# Patient Record
Sex: FEMALE | Race: BLACK OR AFRICAN AMERICAN | NOT HISPANIC OR LATINO
[De-identification: names, ages, dates, MRNs, and addresses within clinical notes are randomized per-mention and may not be internally consistent; named-entity substitution may affect disease eponyms.]

---

## 2017-10-26 ENCOUNTER — APPOINTMENT (OUTPATIENT)
Dept: NEUROLOGY | Facility: CLINIC | Age: 57
End: 2017-10-26

## 2017-10-26 VITALS
WEIGHT: 130 LBS | BODY MASS INDEX: 23.92 KG/M2 | OXYGEN SATURATION: 97 % | SYSTOLIC BLOOD PRESSURE: 110 MMHG | DIASTOLIC BLOOD PRESSURE: 70 MMHG | TEMPERATURE: 98 F | HEIGHT: 62 IN | HEART RATE: 87 BPM

## 2017-11-02 ENCOUNTER — APPOINTMENT (OUTPATIENT)
Dept: PULMONOLOGY | Facility: CLINIC | Age: 57
End: 2017-11-02

## 2017-11-07 ENCOUNTER — APPOINTMENT (OUTPATIENT)
Dept: MRI IMAGING | Facility: CLINIC | Age: 57
End: 2017-11-07
Payer: MEDICARE

## 2017-11-07 ENCOUNTER — OUTPATIENT (OUTPATIENT)
Dept: OUTPATIENT SERVICES | Facility: HOSPITAL | Age: 57
LOS: 1 days | End: 2017-11-07
Payer: MEDICAID

## 2017-11-07 PROCEDURE — 70553 MRI BRAIN STEM W/O & W/DYE: CPT

## 2017-11-07 PROCEDURE — 70553 MRI BRAIN STEM W/O & W/DYE: CPT | Mod: 26

## 2017-11-07 PROCEDURE — A9585: CPT

## 2017-11-08 ENCOUNTER — RX RENEWAL (OUTPATIENT)
Age: 57
End: 2017-11-08

## 2017-11-08 ENCOUNTER — OTHER (OUTPATIENT)
Age: 57
End: 2017-11-08

## 2017-11-09 ENCOUNTER — APPOINTMENT (OUTPATIENT)
Dept: PULMONOLOGY | Facility: CLINIC | Age: 57
End: 2017-11-09

## 2017-11-19 ENCOUNTER — OUTPATIENT (OUTPATIENT)
Dept: OUTPATIENT SERVICES | Facility: HOSPITAL | Age: 57
LOS: 1 days | End: 2017-11-19
Payer: MEDICARE

## 2017-11-19 PROCEDURE — 70544 MR ANGIOGRAPHY HEAD W/O DYE: CPT | Mod: 26

## 2017-11-19 PROCEDURE — 70544 MR ANGIOGRAPHY HEAD W/O DYE: CPT

## 2017-12-06 ENCOUNTER — APPOINTMENT (OUTPATIENT)
Dept: NEUROLOGY | Facility: CLINIC | Age: 57
End: 2017-12-06

## 2017-12-13 ENCOUNTER — APPOINTMENT (OUTPATIENT)
Dept: NEUROLOGY | Facility: CLINIC | Age: 57
End: 2017-12-13
Payer: MEDICARE

## 2017-12-13 PROCEDURE — 95819 EEG AWAKE AND ASLEEP: CPT

## 2017-12-21 ENCOUNTER — MEDICATION RENEWAL (OUTPATIENT)
Age: 57
End: 2017-12-21

## 2018-02-27 ENCOUNTER — APPOINTMENT (OUTPATIENT)
Dept: NEUROLOGY | Facility: CLINIC | Age: 58
End: 2018-02-27

## 2018-07-27 ENCOUNTER — RX RENEWAL (OUTPATIENT)
Age: 58
End: 2018-07-27

## 2018-09-18 ENCOUNTER — APPOINTMENT (OUTPATIENT)
Dept: NEUROLOGY | Facility: CLINIC | Age: 58
End: 2018-09-18
Payer: MEDICARE

## 2018-09-18 VITALS
TEMPERATURE: 98.3 F | OXYGEN SATURATION: 100 % | DIASTOLIC BLOOD PRESSURE: 61 MMHG | WEIGHT: 120 LBS | BODY MASS INDEX: 22.08 KG/M2 | HEIGHT: 62 IN | SYSTOLIC BLOOD PRESSURE: 97 MMHG | HEART RATE: 78 BPM

## 2018-09-18 PROCEDURE — 99214 OFFICE O/P EST MOD 30 MIN: CPT

## 2018-09-18 PROCEDURE — 95819 EEG AWAKE AND ASLEEP: CPT

## 2018-09-19 ENCOUNTER — OTHER (OUTPATIENT)
Age: 58
End: 2018-09-19

## 2018-11-16 ENCOUNTER — MEDICATION RENEWAL (OUTPATIENT)
Age: 58
End: 2018-11-16

## 2018-11-26 ENCOUNTER — APPOINTMENT (OUTPATIENT)
Dept: CT IMAGING | Facility: HOSPITAL | Age: 58
End: 2018-11-26

## 2018-12-12 ENCOUNTER — APPOINTMENT (OUTPATIENT)
Dept: CT IMAGING | Facility: HOSPITAL | Age: 58
End: 2018-12-12
Payer: COMMERCIAL

## 2018-12-12 ENCOUNTER — OUTPATIENT (OUTPATIENT)
Dept: OUTPATIENT SERVICES | Facility: HOSPITAL | Age: 58
LOS: 1 days | End: 2018-12-12
Payer: COMMERCIAL

## 2018-12-12 PROCEDURE — 70496 CT ANGIOGRAPHY HEAD: CPT | Mod: 26

## 2018-12-12 PROCEDURE — 70496 CT ANGIOGRAPHY HEAD: CPT

## 2018-12-20 ENCOUNTER — OTHER (OUTPATIENT)
Age: 58
End: 2018-12-20

## 2019-05-08 ENCOUNTER — APPOINTMENT (OUTPATIENT)
Dept: OTOLARYNGOLOGY | Facility: CLINIC | Age: 59
End: 2019-05-08
Payer: MEDICARE

## 2019-05-08 VITALS
SYSTOLIC BLOOD PRESSURE: 130 MMHG | HEIGHT: 62 IN | HEART RATE: 78 BPM | DIASTOLIC BLOOD PRESSURE: 71 MMHG | WEIGHT: 113 LBS | BODY MASS INDEX: 20.8 KG/M2

## 2019-05-08 DIAGNOSIS — Z78.9 OTHER SPECIFIED HEALTH STATUS: ICD-10-CM

## 2019-05-08 DIAGNOSIS — K21.0 GASTRO-ESOPHAGEAL REFLUX DISEASE WITH ESOPHAGITIS: ICD-10-CM

## 2019-05-08 DIAGNOSIS — Z86.79 PERSONAL HISTORY OF OTHER DISEASES OF THE CIRCULATORY SYSTEM: ICD-10-CM

## 2019-05-08 DIAGNOSIS — F12.90 CANNABIS USE, UNSPECIFIED, UNCOMPLICATED: ICD-10-CM

## 2019-05-08 DIAGNOSIS — R05 COUGH: ICD-10-CM

## 2019-05-08 DIAGNOSIS — Z86.2 PERSONAL HISTORY OF DISEASES OF THE BLOOD AND BLOOD-FORMING ORGANS AND CERTAIN DISORDERS INVOLVING THE IMMUNE MECHANISM: ICD-10-CM

## 2019-05-08 DIAGNOSIS — R68.89 OTHER GENERAL SYMPTOMS AND SIGNS: ICD-10-CM

## 2019-05-08 PROCEDURE — 31575 DIAGNOSTIC LARYNGOSCOPY: CPT

## 2019-05-08 PROCEDURE — 99204 OFFICE O/P NEW MOD 45 MIN: CPT | Mod: 25

## 2019-05-14 ENCOUNTER — APPOINTMENT (OUTPATIENT)
Dept: NEUROLOGY | Facility: CLINIC | Age: 59
End: 2019-05-14
Payer: MEDICARE

## 2019-05-14 VITALS
SYSTOLIC BLOOD PRESSURE: 119 MMHG | TEMPERATURE: 99 F | DIASTOLIC BLOOD PRESSURE: 72 MMHG | HEIGHT: 62 IN | HEART RATE: 85 BPM | BODY MASS INDEX: 20.06 KG/M2 | WEIGHT: 109 LBS | OXYGEN SATURATION: 98 %

## 2019-05-14 PROCEDURE — 99214 OFFICE O/P EST MOD 30 MIN: CPT

## 2019-05-14 NOTE — PHYSICAL EXAM
[FreeTextEntry1] : neuro \par alert and oriented x3\par attn c ocn lang nl\par Cn intact in detail\par VA 20/25 bilat\par VFF\par Motor 5/5\par CSG wnl

## 2019-05-14 NOTE — ASSESSMENT
[FreeTextEntry1] : \par A/P- \par 1. Sarcoidosis, sickle cell trait and idiopathic strokes (none since 2005) on ASA. MRI stable in 2018.\par \par 2. Seizures- none since 2012- cont lev 500 bid\par \par 3. Incidental left ACom 2mm aneurysm- MRA one to two years\par \par 4. Thyroidectomy- cleared neurologically, will give keppra 1000mg day before and day of surgery\par \par rtc 6m\par . \par \par

## 2019-05-14 NOTE — HISTORY OF PRESENT ILLNESS
[FreeTextEntry1] : cc- seizures, strokes and sarcoid\par \par No auras of seizures since last visit.\par To have thyroidectomy.\par Also c/o HA behind her right eye that radiates to the right posterior head regions. \par \par Has been seizure free for over 6 yrs. \par \par Idiopathic strokes with sickle cell trait on baby ASA- likely before 2005.\par Saw pulmonologist- waiting on her PFT result.\par \par Had a Left ACOM- has  2mm aneurysm on MRA.\par EEG in Dec - LT epileptiform spikes\par \par meds- Lev 500 bid\par baby ASA one /d \par vit D\par meloxicam prn\par \par \par

## 2019-06-11 ENCOUNTER — EMERGENCY (EMERGENCY)
Facility: HOSPITAL | Age: 59
LOS: 1 days | Discharge: HOME | End: 2019-06-11
Admitting: EMERGENCY MEDICINE
Payer: COMMERCIAL

## 2019-06-11 VITALS
OXYGEN SATURATION: 97 % | TEMPERATURE: 98 F | SYSTOLIC BLOOD PRESSURE: 130 MMHG | HEART RATE: 79 BPM | RESPIRATION RATE: 16 BRPM | DIASTOLIC BLOOD PRESSURE: 79 MMHG

## 2019-06-11 PROCEDURE — 70450 CT HEAD/BRAIN W/O DYE: CPT | Mod: 26

## 2019-06-11 PROCEDURE — 99284 EMERGENCY DEPT VISIT MOD MDM: CPT

## 2019-06-11 PROCEDURE — 70450 CT HEAD/BRAIN W/O DYE: CPT

## 2019-06-11 PROCEDURE — 99284 EMERGENCY DEPT VISIT MOD MDM: CPT | Mod: 25

## 2019-06-11 RX ORDER — MELOXICAM 15 MG/1
1 TABLET ORAL
Qty: 0 | Refills: 0 | DISCHARGE

## 2019-06-11 RX ORDER — LEVETIRACETAM 250 MG/1
1 TABLET, FILM COATED ORAL
Qty: 0 | Refills: 0 | DISCHARGE

## 2019-06-11 RX ORDER — ASPIRIN/CALCIUM CARB/MAGNESIUM 324 MG
1 TABLET ORAL
Qty: 0 | Refills: 0 | DISCHARGE

## 2019-06-11 NOTE — ED ADULT TRIAGE NOTE - CHIEF COMPLAINT QUOTE
pt c/o head injury after a freezer box hit her in the head. denies LOC, nausea, vomiting, dizziness, vision changes.

## 2019-06-11 NOTE — ED PROVIDER NOTE - CLINICAL SUMMARY MEDICAL DECISION MAKING FREE TEXT BOX
57 y/o female here with closed head injury with dizziness since incident. Pt reports pain to the right parietal area with improving "lump". Pt here concnerned given past med hx of cva/seizure. Denies taking anything for the pain. Neuro intact. CPSs negative. CT head injury for intracranial hemorrhage/shift. pt offered tylenol, however refused. Advised cognitive rest and to follow up with PMD.

## 2019-06-11 NOTE — ED PROVIDER NOTE - OBJECTIVE STATEMENT
57 y/o female with a PMHx of seizure, CVA, brain aneurysm is present in the ED c/o head injury x5 days ago. Pt reports the top of a freezer door and a fan fell on top of her head. She denies LOC but since the incident has been experiencing dizziness, scalp tenderness, feels a little off balance more than usual. Pt reports due to her cva she has always had a slight imbalance, but feels as if it may have exacerbated since the incident. She denies the following: syncope, blurred vision, double vision, N/V, neck/back pain, bleeding, slurred speech, confusion.

## 2019-06-11 NOTE — ED ADULT NURSE NOTE - OBJECTIVE STATEMENT
Patient AOX4 c/o of freezer box hitting head---denies LOC/nausea/vomiting/visual changes/changes in speech. Hx of epilepsy. Ambulating independently with steady gait. Answering questions and following verbal commands appropriately.

## 2019-06-15 DIAGNOSIS — S09.90XA UNSPECIFIED INJURY OF HEAD, INITIAL ENCOUNTER: ICD-10-CM
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
## 2019-06-21 VITALS
TEMPERATURE: 97 F | HEIGHT: 61 IN | HEART RATE: 78 BPM | RESPIRATION RATE: 16 BRPM | SYSTOLIC BLOOD PRESSURE: 106 MMHG | OXYGEN SATURATION: 99 % | WEIGHT: 108.69 LBS | DIASTOLIC BLOOD PRESSURE: 52 MMHG

## 2019-06-21 NOTE — ASU PATIENT PROFILE, ADULT - PSH
H/O knee surgery  2002  History of ankle surgery  2014  S/P breast lumpectomy H/O knee surgery  2002  History of ankle surgery  2014  Medial meniscus tear  (R) knee  S/P breast lumpectomy H/O knee surgery  2002- RIGHT KNEE  History of ankle surgery  2014- LEFT ANKLE  Medial meniscus tear  (R) knee  S/P breast lumpectomy  LEFT

## 2019-06-24 ENCOUNTER — RESULT REVIEW (OUTPATIENT)
Age: 59
End: 2019-06-24

## 2019-06-24 ENCOUNTER — OUTPATIENT (OUTPATIENT)
Dept: OUTPATIENT SERVICES | Facility: HOSPITAL | Age: 59
LOS: 1 days | Discharge: HOME | End: 2019-06-24
Payer: COMMERCIAL

## 2019-06-24 ENCOUNTER — APPOINTMENT (OUTPATIENT)
Dept: OTOLARYNGOLOGY | Facility: HOSPITAL | Age: 59
End: 2019-06-24

## 2019-06-24 VITALS
RESPIRATION RATE: 24 BRPM | SYSTOLIC BLOOD PRESSURE: 125 MMHG | DIASTOLIC BLOOD PRESSURE: 53 MMHG | HEART RATE: 70 BPM | OXYGEN SATURATION: 97 %

## 2019-06-24 DIAGNOSIS — Z98.890 OTHER SPECIFIED POSTPROCEDURAL STATES: Chronic | ICD-10-CM

## 2019-06-24 DIAGNOSIS — S83.249A OTHER TEAR OF MEDIAL MENISCUS, CURRENT INJURY, UNSPECIFIED KNEE, INITIAL ENCOUNTER: Chronic | ICD-10-CM

## 2019-06-24 PROBLEM — Z78.9 RARELY CONSUMES ALCOHOL: Status: ACTIVE | Noted: 2019-06-24

## 2019-06-24 PROBLEM — Z86.79 HISTORY OF CARDIAC MURMUR: Status: RESOLVED | Noted: 2019-06-24 | Resolved: 2019-06-24

## 2019-06-24 PROBLEM — F12.90 USES MARIJUANA: Status: ACTIVE | Noted: 2019-06-24

## 2019-06-24 PROBLEM — R05 DRY COUGH: Status: ACTIVE | Noted: 2019-06-24

## 2019-06-24 PROBLEM — K21.0 GASTRO-ESOPHAGEAL REFLUX DISEASE WITH ESOPHAGITIS: Status: ACTIVE | Noted: 2019-06-24

## 2019-06-24 PROBLEM — Z86.2 HISTORY OF SICKLE CELL TRAIT: Status: RESOLVED | Noted: 2019-06-24 | Resolved: 2019-06-24

## 2019-06-24 PROBLEM — R68.89 CHRONIC THROAT CLEARING: Status: ACTIVE | Noted: 2019-06-24

## 2019-06-24 LAB
CALCIUM SERPL-MCNC: 9.1 MG/DL — SIGNIFICANT CHANGE UP (ref 8.4–10.5)
PTH-INTACT IO % DIF SERPL: <4 PG/ML — LOW (ref 8.5–72.5)

## 2019-06-24 PROCEDURE — 60240 REMOVAL OF THYROID: CPT | Mod: GC

## 2019-06-24 PROCEDURE — 88307 TISSUE EXAM BY PATHOLOGIST: CPT

## 2019-06-24 PROCEDURE — 82310 ASSAY OF CALCIUM: CPT

## 2019-06-24 PROCEDURE — 60240 REMOVAL OF THYROID: CPT

## 2019-06-24 PROCEDURE — 83970 ASSAY OF PARATHORMONE: CPT

## 2019-06-24 RX ORDER — CALCITRIOL 0.5 UG/1
1 CAPSULE ORAL
Qty: 60 | Refills: 0
Start: 2019-06-24 | End: 2019-07-23

## 2019-06-24 RX ORDER — ONDANSETRON 8 MG/1
4 TABLET, FILM COATED ORAL EVERY 6 HOURS
Refills: 0 | Status: DISCONTINUED | OUTPATIENT
Start: 2019-06-24 | End: 2019-06-24

## 2019-06-24 RX ORDER — CALCITRIOL 0.5 UG/1
0.5 CAPSULE ORAL DAILY
Refills: 0 | Status: DISCONTINUED | OUTPATIENT
Start: 2019-06-24 | End: 2019-06-24

## 2019-06-24 RX ORDER — ADHESIVE TAPE 3"X 2.3 YD
50 MCG TAPE, NON-MEDICATED TOPICAL
Refills: 0 | Status: ACTIVE | COMMUNITY

## 2019-06-24 RX ORDER — HYDROMORPHONE HYDROCHLORIDE 2 MG/ML
0.5 INJECTION INTRAMUSCULAR; INTRAVENOUS; SUBCUTANEOUS
Refills: 0 | Status: DISCONTINUED | OUTPATIENT
Start: 2019-06-24 | End: 2019-06-24

## 2019-06-24 RX ORDER — ACETAMINOPHEN 500 MG
650 TABLET ORAL EVERY 6 HOURS
Refills: 0 | Status: DISCONTINUED | OUTPATIENT
Start: 2019-06-24 | End: 2019-06-24

## 2019-06-24 RX ORDER — SODIUM CHLORIDE 9 MG/ML
1000 INJECTION INTRAMUSCULAR; INTRAVENOUS; SUBCUTANEOUS
Refills: 0 | Status: DISCONTINUED | OUTPATIENT
Start: 2019-06-24 | End: 2019-06-24

## 2019-06-24 RX ORDER — CALCIUM CARBONATE 500(1250)
3 TABLET ORAL
Qty: 270 | Refills: 0
Start: 2019-06-24 | End: 2019-07-23

## 2019-06-24 RX ORDER — CALCIUM CARBONATE 500(1250)
2 TABLET ORAL ONCE
Refills: 0 | Status: COMPLETED | OUTPATIENT
Start: 2019-06-24 | End: 2019-06-24

## 2019-06-24 RX ADMIN — Medication 2 TABLET(S): at 19:31

## 2019-06-24 RX ADMIN — CALCITRIOL 0.5 MICROGRAM(S): 0.5 CAPSULE ORAL at 19:29

## 2019-06-24 NOTE — CONSULT LETTER
[Dear  ___] : Dear  [unfilled], [Consult Letter:] : I had the pleasure of evaluating your patient, [unfilled]. [Consult Closing:] : Thank you very much for allowing me to participate in the care of this patient.  If you have any questions, please do not hesitate to contact me. [Sincerely,] : Sincerely, [FreeTextEntry2] : Richard Bravo MD\par [address]\[place] [address]\par  [FreeTextEntry1] : \par Ms. Burnett has multinodular goiter and will undergo total thyroidectomy on June 24, 2019, at [facility].\par \par  [FreeTextEntry3] : \par Sugey Bennett MD \par Otolaryngology, Head and Neck Surgery \par \par   [DrMargareth  ___] : Dr. JI

## 2019-06-24 NOTE — PROCEDURE
[de-identified] : \par Indication:  hoarseness\par -Verbal consent was obtained from patient prior to procedure.\par -Santhosh-Synephrine  spray applied to the nasal cavities.\par Flexible laryngoscopy was performed via nostril and revealed the following:\par   -- Nasopharynx had no mass or exudate.\par   -- Base of tongue was symmetric and not enlarged.\par   -- Vallecula was clear\par   -- Epiglottis, both aryepiglottic folds and both false vocal folds were normal\par   -- Arytenoids both with mild edema and erythema \par   -- True vocal folds were fully mobile and had small bilateral nodules. \par   -- Post cricoid area was clear.\par   -- Interarytenoid edema was  present.\par \par The patient tolerated the procedure well.\par

## 2019-06-24 NOTE — BRIEF OPERATIVE NOTE - OPERATION/FINDINGS
Total thyroidectomy, RLN intact and stimulated at conclusion of case. Gelfoam with thrombin placed in thyroid bed Total thyroidectomy for bilateral enlarged and nodular glands.  Bilateral upper and right lower parathyroid glands were identified and preserved.  Bilateral RLN intact; stimulated with 1.0 mAmp with appropriate response prior to closure.   Monocryl and Dermabond closure

## 2019-06-24 NOTE — PHYSICAL EXAM
[Midline] : trachea located in midline position [Laryngoscopy Performed] : laryngoscopy was performed, see procedure section for findings [Normal] : no rashes [FreeTextEntry1] : voice is hoarse. [de-identified] : Right lobe thyroid >> left, but both enlarged. [de-identified] : Pomeroy sign is negative.  [de-identified] : Carotid pulses 2+ bilateral.

## 2019-06-24 NOTE — HISTORY OF PRESENT ILLNESS
[de-identified] : I was pleased to evaluate this 58-year-old woman who was referred by Dr. Bravo for a thyroid problem.  \par \par Ms. Burnett noted enlargement of thyroid over the past year.\par Occasional cough and constant throat clearing over past few months.\par Denies difficulty breathing and difficulty swallowing.\par Hoarseness intermittent in past but more often in past few months. \par She is euthyroid.\par Family history is negative for thyroid disease or cancer\par No history of radiation other than routine.\par \par Also with many year hx of left preauricular pit and intermittent edema, with occasional foul-smelling discharge expressed.  Recently with swelling and mild tenderness in area.\par   \par \par The medical records were reviewed and include the following:\par U/S-Guided FNA RIGHT THYROID NODULE (3/27/2019)\par  -- benign adenomatous nodule ([place] 2)\par \par \par \par

## 2019-06-24 NOTE — REVIEW OF SYSTEMS
[Patient Intake Form Reviewed] : Patient intake form was reviewed [As Noted in HPI] : as noted in HPI [Red Eyes] : red eyes [Dry Eyes] : dry eyes [Eyesight Problems] : eyesight problems [Eyes Itch] : itching of the eyes [Negative] : Psychiatric [de-identified] : seizure d/o

## 2019-06-24 NOTE — ASSESSMENT
[FreeTextEntry1] : Ms. Burnett was evaluated for the following:\par \par 1.) multinodular goiter - starting to cause compressive sx.\par 2.) hoarseness - due to vocal cord nodules and mild reflux changes\par 3.) cough and throat clearing may be from combination of goiter and reflux\par \par PLAN:\par I recommended total thyroidectomy for the very large goiter.  She has hx of seizure d/o, last episode 1 1/2 to 2 years ago.\par We discussed risks, benefits and alternatives to the procedure.  Risks include but are not limited to, bleeding, infection, voice change, vocal cord paresis or paralysis, difficulty swallowing, difficulty breathing, hypocalcemia and scar.  \par She understands that she would require lifelong thyroid hormone after total thyroidectomy\par Questions have been answered.  She has opted for total thyroidectomy.\par Thyroidectomy has been scheduled for June 24, 2019  at [facility].  \par Preoperative medical evaluation and optimization will be requested.\par \par Reflux precautions\par Voice therapy after thyroid surgery.\par \par

## 2019-06-27 PROBLEM — S09.90XA UNSPECIFIED INJURY OF HEAD, INITIAL ENCOUNTER: Chronic | Status: ACTIVE | Noted: 2019-06-21

## 2019-06-27 PROBLEM — R56.9 UNSPECIFIED CONVULSIONS: Chronic | Status: ACTIVE | Noted: 2019-06-11

## 2019-06-27 PROBLEM — D57.3 SICKLE-CELL TRAIT: Chronic | Status: ACTIVE | Noted: 2019-06-21

## 2019-06-27 PROBLEM — D86.0 SARCOIDOSIS OF LUNG: Chronic | Status: ACTIVE | Noted: 2019-06-21

## 2019-06-27 PROBLEM — I72.9 ANEURYSM OF UNSPECIFIED SITE: Chronic | Status: ACTIVE | Noted: 2019-06-21

## 2019-06-28 ENCOUNTER — APPOINTMENT (OUTPATIENT)
Dept: OTOLARYNGOLOGY | Facility: CLINIC | Age: 59
End: 2019-06-28
Payer: MEDICARE

## 2019-06-28 LAB — SURGICAL PATHOLOGY STUDY: SIGNIFICANT CHANGE UP

## 2019-06-28 PROCEDURE — 99024 POSTOP FOLLOW-UP VISIT: CPT

## 2019-07-15 NOTE — PROCEDURE
[de-identified] : \par Transoral flexible laryngoscopy revealed normal vocal fold mobility bilaterally. \par Epiglottis, AE folds and arytenoids are intact; mild edema of arytenoid bilateral.\par Base of tongue and hypopharynx are normal.\par

## 2019-07-15 NOTE — PHYSICAL EXAM
[Normal] : normal appearance, well groomed, well nourished, and in no acute distress [FreeTextEntry1] : Minimal hoarseness. [de-identified] : Steri strips and Dermabond in place.  Monocryl cut at the skin.  Mild edema at surgical site.

## 2019-07-15 NOTE — CONSULT LETTER
[Dear  ___] : Dear  [unfilled], [Courtesy Letter:] : I had the pleasure of seeing your patient, [unfilled], in my office today. [Consult Closing:] : Thank you very much for allowing me to participate in the care of this patient.  If you have any questions, please do not hesitate to contact me. [Sincerely,] : Sincerely, [FreeTextEntry2] : Richard Bravo MD\par [address]\[place] [address]\par  [FreeTextEntry1] : \par Ms. Burnett underwent total thyroidectomy for multinodular goiter on June 24, 2019, at [facility].\par \par Please see further description in my office notes.\par \par  [FreeTextEntry3] : \par Sugey Bennett MD \par Otolaryngology, Head and Neck Surgery \par \par   [DrMargareth  ___] : Dr. JI [___] : [unfilled]

## 2019-07-15 NOTE — HISTORY OF PRESENT ILLNESS
[de-identified] : Ms. Burnett is s/p total thyroidectomy for multinodular goiter on June 24, 2019, at [facility].\par She went home on day of surgery with calcium and rocaltrol for IoPTH < 4.\par Today, she reports that breathing is much better and she no longer feels neck pressure.\par No paresthesias or dysphagia.  Voice is improving.\par \par \par PATHOLOGY total thyroid gland (6/24/2019):\par - Nodular thyroid hyperplasia\par - 3 unremarkable parathyroid glands (right inferior, left superior and left inferior)\par

## 2019-07-15 NOTE — ASSESSMENT
[FreeTextEntry1] : Ms. Burnett is recovering from thyroidectomy.\par She had hypoparathyroidism by labs.  \par I did not see parathyroid tissue on the thyroid specimen.\par \par PLAN:\par She will see Dr. Bravo next week\par Check CA and PTH levels\par Take calcium and rocaltrol.\par \par I will see her in a few weeks.\par \par \par

## 2019-08-07 ENCOUNTER — APPOINTMENT (OUTPATIENT)
Dept: OTOLARYNGOLOGY | Facility: CLINIC | Age: 59
End: 2019-08-07
Payer: MEDICARE

## 2019-08-07 VITALS
BODY MASS INDEX: 19.88 KG/M2 | DIASTOLIC BLOOD PRESSURE: 51 MMHG | WEIGHT: 108 LBS | HEART RATE: 82 BPM | HEIGHT: 62 IN | SYSTOLIC BLOOD PRESSURE: 118 MMHG

## 2019-08-07 DIAGNOSIS — E04.2 NONTOXIC MULTINODULAR GOITER: ICD-10-CM

## 2019-08-07 DIAGNOSIS — Z86.39 PERSONAL HISTORY OF OTHER ENDOCRINE, NUTRITIONAL AND METABOLIC DISEASE: ICD-10-CM

## 2019-08-07 DIAGNOSIS — R20.2 PARESTHESIA OF SKIN: ICD-10-CM

## 2019-08-07 PROCEDURE — 99213 OFFICE O/P EST LOW 20 MIN: CPT | Mod: 24

## 2019-08-07 RX ORDER — CALCITRIOL 0.5 UG/1
0.5 CAPSULE, GELATIN COATED ORAL
Refills: 0 | Status: COMPLETED | COMMUNITY
End: 2019-08-07

## 2019-08-07 RX ORDER — B-COMPLEX WITH VITAMIN C
1250 (500 CA) TABLET ORAL
Refills: 0 | Status: DISCONTINUED | COMMUNITY
End: 2019-08-07

## 2019-08-07 RX ORDER — AZELASTINE HYDROCHLORIDE 0.5 MG/ML
0.05 SOLUTION/ DROPS OPHTHALMIC
Refills: 0 | Status: COMPLETED | COMMUNITY
End: 2019-08-07

## 2019-08-07 NOTE — ASSESSMENT
[FreeTextEntry1] : Ms. Burnett is recovered from thyroidectomy.  She has preop voice and has relief of compressive sx.\par She had hypoparathyroidism by labs, seems resolved now.\par I do not think the paresthesias in her legs/feet is related to low calcium level.\par \par The left preauricular pit/cyst needs to be removed.\par We discussed the procedure, risks, benefits and alternatives to surgery.  I explained the risks, including but not limited to, general anesthesia, bleeding, infection, need for further surgery, recurrence, cosmetic deformity, nerve injury and seroma formation.  \par \par PLAN:\par Continue massage to neck\par f/up with Dr. Bravo for thyroid hormone management\par f/up with Dr. Brizuela re her legs (has appt tomorrow)\par She will contact my office re surgical date for left preauricular pit/cyst excision.\par \par \par \par

## 2019-08-07 NOTE — CONSULT LETTER
[Dear  ___] : Dear  [unfilled], [Courtesy Letter:] : I had the pleasure of seeing your patient, [unfilled], in my office today. [Consult Closing:] : Thank you very much for allowing me to participate in the care of this patient.  If you have any questions, please do not hesitate to contact me. [Sincerely,] : Sincerely, [DrMargareth  ___] : Dr. JI [FreeTextEntry2] : Richard Bravo MD\par [address]\[place] [address]\par  [FreeTextEntry1] : \par \par Enclosed please find my office notes for August 7, 2019. \par \par  [FreeTextEntry3] : \par Sugey Bennett MD \par Otolaryngology, Head and Neck Surgery \par \par

## 2019-08-07 NOTE — PHYSICAL EXAM
[Normal] : normal appearance, well groomed, well nourished, and in no acute distress [FreeTextEntry1] : Minimal raspy voice. [de-identified] : No edema or redness at neck.  Scar healing well. [de-identified] : Left preauricular pit with adjacent edema of cyst < 1 cm, no tenderness or redness; nothing expressed from pit.

## 2019-08-07 NOTE — HISTORY OF PRESENT ILLNESS
[de-identified] : Ms. Burnett reports that voice is back to her normal raspy level.\par s/p total thyroidectomy for multinodular goiter on June 24, 2019.  Postop IoPTH < 4 before d/c home.\par She is happy with appearance of neck scar.\par She is taking 100 mcg LT4 now.  Taking Caltrate daily.\par c/o intermittent numbness or tingling in her feet or calves; goes away when shakes her legs; no change on the calcium supplementation.\par \par Needs left preauricular pit removed.\par Bothering her a little x a few days\par Hx of infection and drainage a few years ago.\par \par \par PATHOLOGY total thyroid gland (6/24/2019):\par - Nodular thyroid hyperplasia\par - 3 unremarkable parathyroid glands (right inferior, left superior and left inferior)\par

## 2020-03-03 ENCOUNTER — EMERGENCY (EMERGENCY)
Facility: HOSPITAL | Age: 60
LOS: 1 days | Discharge: HOME | End: 2020-03-03
Attending: EMERGENCY MEDICINE | Admitting: EMERGENCY MEDICINE
Payer: COMMERCIAL

## 2020-03-03 VITALS
TEMPERATURE: 98 F | OXYGEN SATURATION: 96 % | HEART RATE: 78 BPM | DIASTOLIC BLOOD PRESSURE: 72 MMHG | SYSTOLIC BLOOD PRESSURE: 119 MMHG | RESPIRATION RATE: 18 BRPM

## 2020-03-03 VITALS
HEART RATE: 82 BPM | OXYGEN SATURATION: 96 % | RESPIRATION RATE: 18 BRPM | HEIGHT: 61 IN | DIASTOLIC BLOOD PRESSURE: 71 MMHG | TEMPERATURE: 98 F | WEIGHT: 104.06 LBS | SYSTOLIC BLOOD PRESSURE: 137 MMHG

## 2020-03-03 DIAGNOSIS — S83.249A OTHER TEAR OF MEDIAL MENISCUS, CURRENT INJURY, UNSPECIFIED KNEE, INITIAL ENCOUNTER: Chronic | ICD-10-CM

## 2020-03-03 DIAGNOSIS — Z98.890 OTHER SPECIFIED POSTPROCEDURAL STATES: Chronic | ICD-10-CM

## 2020-03-03 PROCEDURE — 99284 EMERGENCY DEPT VISIT MOD MDM: CPT

## 2020-03-03 PROCEDURE — 99283 EMERGENCY DEPT VISIT LOW MDM: CPT

## 2020-03-03 NOTE — ED PROVIDER NOTE - PATIENT PORTAL LINK FT
You can access the FollowMyHealth Patient Portal offered by [facility] by registering at the following website: [email or web address]. By joining [facility]’s FollowMyHealth portal, you will also be able to view your health information using other applications (apps) compatible with our system.

## 2020-03-03 NOTE — ED ADULT NURSE NOTE - OBJECTIVE STATEMENT
Patient is a 58yo female reporting left shoulder pain described as sharp, sometimes "tingling," radiating into neck and left arm. 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 Denies chest pain, shortness of breath, abdominal pain, n/v/d. Speaking clearly in full sentences, strength equal to bilateral arms. Sensation intact.

## 2020-03-03 NOTE — ED PROVIDER NOTE - CLINICAL SUMMARY MEDICAL DECISION MAKING FREE TEXT BOX
Pt here with left shoulder pain with radiation down arm and neck x 1 week, likely MSK with mild radiculopathy. No chest pain or SOB. EKG NSR, no st elevations or depressions. On exam, neurologically intact. Pt advised to use Ibuprofen, icing and stretching exercises for symptom control, and f/u with PCP for potential MRI to evaluate.

## 2020-03-03 NOTE — ED ADULT NURSE NOTE - PSH
H/O knee surgery  2002- RIGHT KNEE  History of ankle surgery  2014- LEFT ANKLE  Medial meniscus tear  (R) knee  S/P breast lumpectomy  LEFT

## 2020-03-03 NOTE — ED ADULT NURSE NOTE - NSIMPLEMENTINTERV_GEN_ALL_ED
Implemented All Universal Safety Interventions:  [place] to call system. Call bell, personal items and telephone within reach. Instruct patient to call for assistance. Room bathroom lighting operational. Non-slip footwear when patient is off stretcher. Physically safe environment: no spills, clutter or unnecessary equipment. Stretcher in lowest position, wheels locked, appropriate side rails in place.

## 2020-03-03 NOTE — ED PROVIDER NOTE - OBJECTIVE STATEMENT
58 y/o F with PMHx of sarcoidosis of lung in (remission), thyroidectomy in June 2019, grand mal seizures on Keppra 500mg, and osteoporosis presenting to the ED with c/o left shoulder pain x 1 week. Pt reports that the pain starts in her shoulder and radiates down her arm as well as around her neck. Pt also endorses intermittent tingling to her left hand. 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 Pt has not taken anything for pain.

## 2020-03-07 DIAGNOSIS — M54.2 CERVICALGIA: ICD-10-CM

## 2020-03-07 DIAGNOSIS — Z79.891 LONG TERM (CURRENT) USE OF OPIATE ANALGESIC: ICD-10-CM

## 2020-03-07 DIAGNOSIS — M79.602 PAIN IN LEFT ARM: ICD-10-CM

## 2020-03-07 DIAGNOSIS — M25.512 PAIN IN LEFT SHOULDER: ICD-10-CM

## 2020-03-07 DIAGNOSIS — Z79.899 OTHER LONG TERM (CURRENT) DRUG THERAPY: ICD-10-CM

## 2020-03-07 DIAGNOSIS — R20.2 PARESTHESIA OF SKIN: ICD-10-CM

## 2020-03-07 DIAGNOSIS — Z79.82 LONG TERM (CURRENT) USE OF ASPIRIN: ICD-10-CM

## 2020-03-16 NOTE — ED ADULT NURSE NOTE - HOW OFTEN DO YOU HAVE A DRINK CONTAINING ALCOHOL?
Left a message for patient to contact our office regarding question on Vitamin D. When patient contact our office please see Dr. Ramona Walker message below from lab results.     Written by Kristan Bell MD on 3/6/2020  1:26 PM   Vitamin D is low  [phone].
Pt would like to speak with the nurse about her vitamin D-2 medication. For patient it is listed twice once for once a month and the other for once a week.
Spoke with patient, (  verified ) she has the medication and has began taking it.  Labs already ordered    Closing this encounter
Never

## 2020-04-12 NOTE — BRIEF OPERATIVE NOTE - CONDITION POST OP
stable
You likely have Coronavirus.    COVID-19 testing are currently being prioritized at [facility] for admitted patients.     All patients that are stable are being discharged from the ED, even if there is a concern for coronavirus. Since you are stable, you are being discharged. Your test results may take 5-7 days. You will get a text message or email with results. Please check the patient online portal for results. Please follow the instructions on provided coronavirus discharge educational forms and self quarantine for 14 days.     In addition, you have been placed on our surveillance tracker. Return to the ED immediately if you have shortness of breath, fever, pain, weakness, vomiting any concerns.    1. STAY HOME for 14 DAYS  2. Minimize Human contact to ONLY ESSENTIAL  3. Every time you wash your hands, sing the HAPPY BIRTHDAY Song so you know you're washing long enough.  Make sure to scrub the webspace between your fingers.  4. DRINK 1-3 Liters of fluids day x at least 5 days.  To remain hydrated. Your fatigue, lightheadedness, and body aches will decrease and your fever has a better chance of breaking if you are well hydrated.    5. For your Fever and Body aches takes Tylenol 650-100mg every 4-6h (max 4000mg/day). Try not to use ibuprofen, aspirin or naproxen (Advil, Motrin or Aleve) as these may worsen Coronavirus infection.  6. Use an inhaler for mild shortness of breath and cough  7. Take a double or triple dose of Vitamin C (2646-9891 mg) per day spread out over the day .  8. RETURN TO THE ER IMMEDIATELY IF YOU HAVE WORSENING SHORTNESS OF BREATH. SYMPTOMS USUALLY PEAK BETWEEN DAY 7-10.

## 2020-08-04 NOTE — ED ADULT NURSE NOTE - CADM POA URETHRAL CATHETER
Quality 130: Documentation Of Current Medications In The Medical Record: Current Medications Documented Detail Level: Detailed Name And Contact Information For Health Care Proxy: Wife Dori [phone] Quality 111:Pneumonia Vaccination Status For Older Adults: Pneumococcal Vaccination Previously Received Quality 47: Advance Care Plan: Advance Care Planning discussed and documented; advance care plan or surrogate decision maker documented in the medical record. Quality 226: Preventive Care And Screening: Tobacco Use: Screening And Cessation Intervention: Patient screened for tobacco use and is an ex/non-smoker Quality 431: Preventive Care And Screening: Unhealthy Alcohol Use - Screening: Patient screened for unhealthy alcohol use using a single question and scores less than 2 times per year No

## 2020-10-07 ENCOUNTER — APPOINTMENT (OUTPATIENT)
Dept: OTOLARYNGOLOGY | Facility: CLINIC | Age: 60
End: 2020-10-07
Payer: MEDICARE

## 2020-10-07 VITALS
SYSTOLIC BLOOD PRESSURE: 129 MMHG | BODY MASS INDEX: 18.58 KG/M2 | HEIGHT: 62 IN | WEIGHT: 101 LBS | DIASTOLIC BLOOD PRESSURE: 76 MMHG | HEART RATE: 85 BPM | TEMPERATURE: 97.2 F

## 2020-10-07 DIAGNOSIS — R49.0 DYSPHONIA: ICD-10-CM

## 2020-10-07 DIAGNOSIS — H90.41 SENSORINEURAL HEARING LOSS, UNILATERAL, RIGHT EAR, WITH UNRESTRICTED HEARING ON THE CONTRALATERAL SIDE: ICD-10-CM

## 2020-10-07 DIAGNOSIS — J38.2 NODULES OF VOCAL CORDS: ICD-10-CM

## 2020-10-07 PROCEDURE — 99214 OFFICE O/P EST MOD 30 MIN: CPT

## 2020-10-07 PROCEDURE — 92557 COMPREHENSIVE HEARING TEST: CPT

## 2020-10-07 PROCEDURE — 92550 TYMPANOMETRY & REFLEX THRESH: CPT

## 2020-10-07 RX ORDER — MV-MIN/FOLIC/VIT K/LYCOP/COQ10 200-100MCG
CAPSULE ORAL
Refills: 0 | Status: COMPLETED | COMMUNITY
End: 2020-10-07

## 2020-10-07 RX ORDER — LEVOTHYROXINE SODIUM 0.1 MG/1
100 TABLET ORAL
Refills: 0 | Status: COMPLETED | COMMUNITY
End: 2020-10-07

## 2020-10-07 RX ORDER — ASPIRIN 81 MG
81 TABLET,CHEWABLE ORAL DAILY
Refills: 0 | Status: COMPLETED | COMMUNITY
End: 2020-10-07

## 2020-10-07 RX ORDER — MELOXICAM 7.5 MG/1
7.5 TABLET ORAL DAILY
Qty: 30 | Refills: 0 | Status: COMPLETED | COMMUNITY
End: 2020-10-07

## 2020-10-07 NOTE — BRIEF OPERATIVE NOTE - NSICDXBRIEFPROCEDURE_GEN_ALL_CORE_FT
PROCEDURES:  Total thyroidectomy 24-Jun-2019 16:29:53  Rafael Hussein The procedure was performed independently/Supervision was available

## 2020-10-27 ENCOUNTER — APPOINTMENT (OUTPATIENT)
Dept: NEUROLOGY | Facility: CLINIC | Age: 60
End: 2020-10-27
Payer: MEDICARE

## 2020-10-27 ENCOUNTER — TRANSCRIPTION ENCOUNTER (OUTPATIENT)
Age: 60
End: 2020-10-27

## 2020-10-27 PROBLEM — J38.2 VOCAL CORD NODULES: Status: ACTIVE | Noted: 2019-06-24

## 2020-10-27 PROBLEM — R49.0 HOARSENESS: Status: ACTIVE | Noted: 2019-06-24

## 2020-10-27 PROCEDURE — 99214 OFFICE O/P EST MOD 30 MIN: CPT | Mod: 95

## 2020-10-27 RX ORDER — LEVOTHYROXINE SODIUM 0.09 MG/1
88 TABLET ORAL
Qty: 30 | Refills: 0 | Status: COMPLETED | COMMUNITY
Start: 2020-08-06

## 2020-10-27 RX ORDER — NEOMYCIN AND POLYMYXIN B SULFATES AND BACITRACIN ZINC 400; 3.5; 1 [USP'U]/G; MG/G; [USP'U]/G
OINTMENT OPHTHALMIC
Refills: 0 | Status: DISCONTINUED | COMMUNITY
End: 2020-10-07

## 2020-10-27 RX ORDER — LEVETIRACETAM 500 MG/1
500 TABLET, FILM COATED ORAL
Qty: 60 | Refills: 11 | Status: ACTIVE | COMMUNITY
Start: 2018-07-27 | End: 1900-01-01

## 2020-10-27 RX ORDER — AMOXICILLIN 500 MG/1
500 CAPSULE ORAL
Qty: 21 | Refills: 0 | Status: COMPLETED | COMMUNITY
Start: 2020-08-12

## 2020-10-27 NOTE — HISTORY OF PRESENT ILLNESS
[Home] : at home, [unfilled] , at the time of the visit. [Medical Office: ([facility])___] : at the medical office located in  [Verbal consent obtained from patient] : the patient, [unfilled] [FreeTextEntry1] : cc- seizures, strokes and sarcoid\par \par No auras of seizures since last visit.\par Main c/o is sharp pain in head.\par Had thyroidectomy. Has been c/o hearing loss and saw ENT who is ordering MRI.\par Also c/o HA behind her right eye that radiates to the right posterior head regions. \par \par Has been seizure free for over 7 yrs. \par \par Idiopathic strokes with sickle cell trait on baby ASA- likely before 2005.\par \par Had a Left ACOM- has 2mm aneurysm on past MRA.\par EEG in Dec - LT epileptiform spikes\par \par meds- Lev 500 bid\par baby ASA one /d \par vit D\par meloxicam prn\par \par

## 2020-10-27 NOTE — PHYSICAL EXAM
[Midline] : trachea located in midline position [FreeTextEntry1] : Loud raspy voice. [de-identified] : s/p total thyroidectomy [Normal] : tympanic membranes are normal in both ears [de-identified] : Left preauricular pit with scarring, no discharge or swelling.  LEFT EAC clear.  RIGHT EAC cerumen impaction removed with curette  [de-identified] : Carotid pulses 2+ bilateral.

## 2020-10-27 NOTE — CONSULT LETTER
[FreeTextEntry2] : \par  [FreeTextEntry1] : \par \par  [FreeTextEntry3] : \par   [DrMargareth  ___] : Dr. JI

## 2020-10-27 NOTE — REVIEW OF SYSTEMS
[As Noted in HPI] : as noted in HPI [Negative] : Heme/Lymph [FreeTextEntry3] : sarcoidosis behind eye

## 2020-10-27 NOTE — DATA REVIEWED
[de-identified] : \par AUDIOGRAM (10/07/2020)\par Hearing within normal limits through 500 Hz, sloping to mild to severe SNHL.  \par Asymmetry noted at 2-3K Hz, worse on right side.\par Tympanograms  A  bilateral    \par Word recognition 90% on right;  80%  on left\par

## 2020-10-27 NOTE — PHYSICAL EXAM
[FreeTextEntry1] : \par neuro \par alert and oriented x3\par attn conc lang nl\par Cn intact in detail\par VA 20/25 bilat\par VFF\par Motor 5/5\par CSG wnl.

## 2020-10-27 NOTE — ASSESSMENT
[FreeTextEntry1] : A/P- \par 1. Sarcoidosis, sickle cell trait and idiopathic strokes (none since 2005) on ASA. MRI stable in 2018.\par - needs repeat MRI with tanner\par \par 2. Seizures- none since 2012- cont lev 500 bid\par \par 3. Incidental left ACom 2mm aneurysm- repeat MRA\par \par 4. Hearing loss R>L -MRI with attn to IACs\par \par rtc 6m

## 2020-10-27 NOTE — HISTORY OF PRESENT ILLNESS
[de-identified] : Ms. Burnett complains of decreased hearing and difficulty understanding what people are saying over the past 8-9 months.  Her children tell her she can't hear them.  She has much more problems when people are wearing masks.\par \par S/p total thyroidectomy for multinodular goiter on June 24, 2019. Levothyroxine decreased from 88 mcg to levothyroxine 75 mcg daily.\par Needs left preauricular pit removed. Bothering her on and off.  Hx of infection and drainage a few years ago.\par Voice is still raspy.  Has vocal cord nodules.\par History of sarcoidosis, now behind the eye. \par \par \par LABS\par  (7/21/2020) - TSH 0.02, T4 13.6\par \par

## 2020-10-27 NOTE — ASSESSMENT
[FreeTextEntry1] : Ms. Burnett was evaluated for the following issues today:\par \par 1.) Bilateral sensorineural hearing loss, asymmetrical worse at 2-3K Hz on right with word recognition worse on right.  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

## 2020-12-10 ENCOUNTER — APPOINTMENT (OUTPATIENT)
Dept: MRI IMAGING | Facility: HOSPITAL | Age: 60
End: 2020-12-10
Payer: MEDICARE

## 2020-12-10 ENCOUNTER — APPOINTMENT (OUTPATIENT)
Dept: MRI IMAGING | Facility: HOSPITAL | Age: 60
End: 2020-12-10

## 2020-12-10 ENCOUNTER — OUTPATIENT (OUTPATIENT)
Dept: OUTPATIENT SERVICES | Facility: HOSPITAL | Age: 60
LOS: 1 days | End: 2020-12-10
Payer: COMMERCIAL

## 2020-12-10 DIAGNOSIS — Z98.890 OTHER SPECIFIED POSTPROCEDURAL STATES: Chronic | ICD-10-CM

## 2020-12-10 DIAGNOSIS — S83.249A OTHER TEAR OF MEDIAL MENISCUS, CURRENT INJURY, UNSPECIFIED KNEE, INITIAL ENCOUNTER: Chronic | ICD-10-CM

## 2020-12-10 PROCEDURE — 70544 MR ANGIOGRAPHY HEAD W/O DYE: CPT

## 2020-12-10 PROCEDURE — A9585: CPT

## 2020-12-10 PROCEDURE — 70553 MRI BRAIN STEM W/O & W/DYE: CPT

## 2020-12-10 PROCEDURE — 70553 MRI BRAIN STEM W/O & W/DYE: CPT | Mod: 26

## 2020-12-10 PROCEDURE — 70544 MR ANGIOGRAPHY HEAD W/O DYE: CPT | Mod: 26,59

## 2021-01-20 ENCOUNTER — APPOINTMENT (OUTPATIENT)
Dept: OTOLARYNGOLOGY | Facility: CLINIC | Age: 61
End: 2021-01-20
Payer: MEDICARE

## 2021-01-20 PROCEDURE — 99442: CPT

## 2021-01-21 RX ORDER — LEVOTHYROXINE SODIUM 0.07 MG/1
75 TABLET ORAL
Refills: 0 | Status: DISCONTINUED | COMMUNITY
End: 2021-01-07

## 2021-01-21 NOTE — HISTORY OF PRESENT ILLNESS
[de-identified] : TELEPHONIC VISIT\par  Visit initiated at patient request. This audio visit is occurring during the  state of emergency due to COVID-19. Governmental regulation is restricting travel, in-person contact, recommend use of remote activities and telemedicine whenever possible. I discussed with patient the limitations of telemedicine encounters, including risks associated with the technology platform, technical difficulties, data security, and no physical exam. The patient may need further testing and workup to arrive at a diagnosis and treatment plan. We discussed this will be billed as a visit. \par Ms Burnett  understood and elected to proceed at 4:28 pm on 01/20/2021.\par Patient location: Home  in [place], [place].  Patient was the only participant.\par Physician location:  Office of Dr. Bennett at [address] [place] [address] in [place], [place]\par ----------------------------------------------------------------------------------------\par ----------------------------------------------------------------------------------------\par \par Ms. Burnett reports that she still has bilateral tinnitus and HL.  Had MRI brain/IAC to eval for CPA lesion due to asymmetry (worse on right side).  She is still interested in hearing aids.\par She also had MRA/MRV last month to reevaluate aneurysm, which is small and stable.\par Her left preauricular cyst is not bothering her lately.  She does not want to have the cyst removed yet because she has "lot of other doctor's appointments."\par \par MR IAC w/wo IV Contrast (12/10/2020) at [facility]:\par - comparison:   MRI brain dated 11/7/2017\par - No evidence of mass or pathologic contrast enhancement within the cerebellopontine angle cistern or internal auditory canal on either side.  The signal within the membranous labyrinth is normal.\par - The ventricles, sulci and cisterns are normal in caliber for the patient's age. There are again evident areas of encephalomalacia in the bilateral paramedian parietal lobes, as well as a remote lacunar infarct of the right corona radiata. Scattered foci of T2 hyperintensity in the cerebral white matter are again evident. Diffusion-weighted imaging reveals no evidence for recent infarction. Paranasal sinuses and mastoids are predominantly ventilated bilaterally.\par (Images were reviewed.) \par \par MR Angio Brain (12/11/2020) at [facility]:\par IMPRESSION:\par Compared to 11/20/2017, there is a stable 2 mm left ICA aneurysm just distal to the ophthalmic artery takeoff and stable triangular/infundibular origin of the left P-comm.\par \par

## 2021-01-21 NOTE — ASSESSMENT
[FreeTextEntry1] : Ms. Burnett was evaluated for the following issues via telephonic encounter today:\par \par 1.) Bilateral sensorineural hearing loss, asymmetrical - worse at 2-3K Hz on right with word recognition worse on right. MRI IAC/Brain with/without contrast was negative for vestibular schwannoma or other CP angle lesion/enhancement.\par -->  medical clearance for hearing aids\par \par 2.) Preauricular cyst on left side is currently not bothering her.  She is not ready for surgical removal of the cyst due to other matters which take priority.\par \par Return as needed\par \par

## 2021-01-21 NOTE — CONSULT LETTER
[Dear  ___] : Dear  [unfilled], [Courtesy Letter:] : I had the pleasure of seeing your patient, [unfilled], in my office today. [Please see my note below.] : Please see my note below. [Consult Closing:] : Thank you very much for allowing me to participate in the care of this patient.  If you have any questions, please do not hesitate to contact me. [Sincerely,] : Sincerely, [___] : [unfilled] [FreeTextEntry1] : \par \par  [FreeTextEntry2] : Suzanne Brizuela MD\par [address], \[place] [address]\par  [FreeTextEntry3] : \par Sugey Bennett MD \par Otolaryngology, Head and Neck Surgery \par \par

## 2021-01-21 NOTE — DATA REVIEWED
[de-identified] : \par AUDIOGRAM (10/07/2020)\par Hearing within normal limits through 500 Hz, sloping to mild to severe SNHL.  \par Asymmetry noted at 2-3K Hz, worse on right side.\par Tympanograms  A  bilateral    \par Word recognition 90% on right;  80%  on left\par

## 2021-05-18 ENCOUNTER — APPOINTMENT (OUTPATIENT)
Dept: NEUROLOGY | Facility: CLINIC | Age: 61
End: 2021-05-18

## 2021-07-27 ENCOUNTER — APPOINTMENT (OUTPATIENT)
Dept: NEUROLOGY | Facility: CLINIC | Age: 61
End: 2021-07-27
Payer: MEDICARE

## 2021-07-27 VITALS
WEIGHT: 105 LBS | HEART RATE: 83 BPM | HEIGHT: 62 IN | SYSTOLIC BLOOD PRESSURE: 130 MMHG | TEMPERATURE: 97.8 F | OXYGEN SATURATION: 95 % | DIASTOLIC BLOOD PRESSURE: 76 MMHG | BODY MASS INDEX: 19.32 KG/M2

## 2021-07-27 DIAGNOSIS — E03.9 HYPOTHYROIDISM, UNSPECIFIED: ICD-10-CM

## 2021-07-27 DIAGNOSIS — G40.109 LOCALIZATION-RELATED (FOCAL) (PARTIAL) SYMPTOMATIC EPILEPSY AND EPILEPTIC SYNDROMES WITH SIMPLE PARTIAL SEIZURES, NOT INTRACTABLE, W/OUT STATUS EPILEPTICUS: ICD-10-CM

## 2021-07-27 PROCEDURE — 99215 OFFICE O/P EST HI 40 MIN: CPT

## 2021-07-27 RX ORDER — ASPIRIN 81 MG
81 TABLET,CHEWABLE ORAL DAILY
Refills: 0 | Status: ACTIVE | COMMUNITY

## 2021-07-27 NOTE — PHYSICAL EXAM
[General Appearance - Alert] : alert [Cranial Nerves Trigeminal (V)] : facial sensation intact symmetrically [Cranial Nerves Optic (II)] : visual acuity intact bilaterally,  visual fields full to confrontation, pupils equal round and reactive to light [Cranial Nerves Facial (VII)] : face symmetrical [Motor Handedness Right-Handed] : the patient is right hand dominant [Sensation Tactile Decrease] : light touch was intact [Abnormal Walk] : normal gait [Naming Objects] : no difficulty naming common objects [Repeating Phrases] : no difficulty repeating a phrase [Fluency] : fluency intact [Comprehension] : comprehension intact [Current Events] : inadequate knowledge of current events [1+] : Patella left 1+ [FreeTextEntry5] : hearing loss >R

## 2021-07-27 NOTE — DISCUSSION/SUMMARY
[FreeTextEntry1] : 62 y/o female w hx of sarcoidosis, bilateral parietal strokes, likely sickle cell trait, brain aneurysm and epilepsy.\par Stable from epilepsy point of view. \par Bilateral hearing loss NSL\par Aneurysm\par

## 2021-07-27 NOTE — ASSESSMENT
[FreeTextEntry1] : AVSQUEZ\par Continue Keppra\par Ref to Pulmonary and endocrinology to establish care and to Dr Rafael driscoll for her sarcoid, sickle cell trait and previous strokes.

## 2021-07-27 NOTE — HISTORY OF PRESENT ILLNESS
[FreeTextEntry1] : New Patient\par \par 61 y/RHDW- seizures, strokes and sarcoid\par \par PMHx\par \par F/U from [facility] and [facility].\par Has been seizure free for over 6 yrs. \par Idiopathic strokes with sickle cell trait on baby ASA- likely before 2005.\par Saw pulmonologist- not seen x 6 months \par \par Had a Left ACOM 2mm aneurysm on MRA.\par EEG in Dec 2018 - LT epileptiform spikes\par \par meds- Lev 500 bid\par baby ASA one /d \par Thyroid meds 50 mcg\par \par Use to work in childcare\par

## 2021-08-12 ENCOUNTER — APPOINTMENT (OUTPATIENT)
Dept: NEUROLOGY | Facility: CLINIC | Age: 61
End: 2021-08-12
Payer: MEDICARE

## 2021-08-12 PROCEDURE — 95819 EEG AWAKE AND ASLEEP: CPT

## 2021-12-07 ENCOUNTER — APPOINTMENT (OUTPATIENT)
Dept: OTOLARYNGOLOGY | Facility: CLINIC | Age: 61
End: 2021-12-07
Payer: MEDICARE

## 2021-12-07 VITALS
WEIGHT: 109 LBS | SYSTOLIC BLOOD PRESSURE: 123 MMHG | DIASTOLIC BLOOD PRESSURE: 79 MMHG | HEART RATE: 85 BPM | HEIGHT: 62 IN | BODY MASS INDEX: 20.06 KG/M2 | TEMPERATURE: 96.7 F

## 2021-12-07 DIAGNOSIS — Q18.1 PREAURICULAR SINUS AND CYST: ICD-10-CM

## 2021-12-07 DIAGNOSIS — H90.3 SENSORINEURAL HEARING LOSS, BILATERAL: ICD-10-CM

## 2021-12-07 DIAGNOSIS — H93.13 TINNITUS, BILATERAL: ICD-10-CM

## 2021-12-07 PROCEDURE — 99213 OFFICE O/P EST LOW 20 MIN: CPT

## 2021-12-07 NOTE — ASSESSMENT
[FreeTextEntry1] : 60 yo female with known bilateral SNHL, asymmetric in nature and worse on the right.  Previously worked up with negative MRI.  HA offered, but patient did not want at that time due to cost.  No changes in hx or exam.  Pt is due for annual audio/tymp.  Information given along with information regarding tinnitus.  Follow up to review.\par \par - audio/tymp\par - tinnitus handout given\par - fu after above to review results.

## 2021-12-07 NOTE — HISTORY OF PRESENT ILLNESS
[de-identified] : 62 yo female with known hearing loss presents for yearly evaluation.  No new changes in hearing.  She does have tinnitus that she describes as bilateral non pulsatile wind blowing.  Worse in quiet environments, and dissipates if distracted.  No vertiginous symptoms, otorrhea, or otalgia.  No ENT issues otherwise.  Previously hearing was asymmetric and worked up with MRI which was negative for retrocochlear process.  No ENT issues otherwise.

## 2022-01-04 NOTE — ED ADULT NURSE NOTE - PAIN: PRESENCE, MLM
complains of pain/discomfort Erythromycin Counseling:  I discussed with the patient the risks of erythromycin including but not limited to GI upset, allergic reaction, drug rash, diarrhea, increase in liver enzymes, and yeast infections.

## 2022-01-06 ENCOUNTER — APPOINTMENT (OUTPATIENT)
Dept: PULMONOLOGY | Facility: CLINIC | Age: 62
End: 2022-01-06
Payer: MEDICARE

## 2022-01-06 VITALS
SYSTOLIC BLOOD PRESSURE: 110 MMHG | BODY MASS INDEX: 19.51 KG/M2 | HEIGHT: 62 IN | HEART RATE: 92 BPM | WEIGHT: 106 LBS | DIASTOLIC BLOOD PRESSURE: 68 MMHG | TEMPERATURE: 97.3 F | OXYGEN SATURATION: 97 %

## 2022-01-06 PROCEDURE — 99204 OFFICE O/P NEW MOD 45 MIN: CPT | Mod: 25

## 2022-01-06 PROCEDURE — 71046 X-RAY EXAM CHEST 2 VIEWS: CPT

## 2022-01-06 NOTE — CONSULT LETTER
[Dear  ___] : Dear  [unfilled], [( Thank you for referring [unfilled] for consultation for _____ )] : Thank you for referring [unfilled] for consultation for [unfilled] [Please see my note below.] : Please see my note below. [Consult Closing:] : Thank you very much for allowing me to participate in the care of this patient.  If you have any questions, please do not hesitate to contact me. [Sincerely,] : Sincerely, [FreeTextEntry2] : Suzanne Brizuela MD\par [address]\[place] [address] [FreeTextEntry3] : Roberta Lau MD, FCCP\par

## 2022-01-06 NOTE — HISTORY OF PRESENT ILLNESS
[TextBox_4] : 01/06/2022: Asked to evaluate patient by Dr Brizuela (fax [phone]) for sarcoidosis, diagnosed in 1990 and treated at [facility]. She presented w dyspnea and syncope and was diagnosed by skin biopsy. She was never treated w medication. She reports that she has eye involvement but has not been treated for this either. She has seen ophtho within past year. She has a history of seizure and stroke and has seen neuro here. No recent chest imaging. She can't remember when she last saw pulm or who it was. Vaxxed for Covid x 2, not boosted. More dyspneic lately.\par

## 2022-02-10 ENCOUNTER — APPOINTMENT (OUTPATIENT)
Dept: PULMONOLOGY | Facility: CLINIC | Age: 62
End: 2022-02-10

## 2022-04-07 ENCOUNTER — APPOINTMENT (OUTPATIENT)
Dept: OTOLARYNGOLOGY | Facility: CLINIC | Age: 62
End: 2022-04-07
Payer: MEDICARE

## 2022-04-07 VITALS
HEART RATE: 87 BPM | BODY MASS INDEX: 19.51 KG/M2 | TEMPERATURE: 97.4 F | SYSTOLIC BLOOD PRESSURE: 106 MMHG | DIASTOLIC BLOOD PRESSURE: 65 MMHG | HEIGHT: 62 IN | WEIGHT: 106 LBS | OXYGEN SATURATION: 98 %

## 2022-04-07 DIAGNOSIS — R51.9 HEADACHE, UNSPECIFIED: ICD-10-CM

## 2022-04-07 DIAGNOSIS — H90.3 SENSORINEURAL HEARING LOSS, BILATERAL: ICD-10-CM

## 2022-04-07 DIAGNOSIS — H90.5 UNSPECIFIED SENSORINEURAL HEARING LOSS: ICD-10-CM

## 2022-04-07 PROCEDURE — 99214 OFFICE O/P EST MOD 30 MIN: CPT

## 2022-04-07 NOTE — HISTORY OF PRESENT ILLNESS
[de-identified] : 12/7/21\par 62 yo female with known hearing loss presents for yearly evaluation. No new changes in hearing. She does have tinnitus that she describes as bilateral non pulsatile wind blowing. Worse in quiet environments, and dissipates if distracted. No vertiginous symptoms, otorrhea, or otalgia. No ENT issues otherwise. Previously hearing was asymmetric and worked up with MRI which was negative for retrocochlear process. No ENT issues otherwise.\par - [FreeTextEntry1] : Update 4/7/22\par Pt still has not completed repeat audio/tymp.  Today she presents with pain at the center and back of her head that is intermittent and sharp in nature and started 1 month ago. It lasts for a few minutes and resolves when she lies down.  She hasn’t noticed that anything makes it worse.  She notes it may be related to recent stress of losing her sister.  She has not tried any treatments for this. Tinnitus has improved since. Hearing loss remains but no worsening per pt. No new ENT issues at this time.

## 2022-04-07 NOTE — REASON FOR VISIT
[Subsequent Evaluation] : a subsequent evaluation for [FreeTextEntry2] : pain at the occipital region of head

## 2022-04-07 NOTE — ASSESSMENT
[FreeTextEntry1] : 62 yo female with known bilateral SNHL, asymmetric in nature and worse on the right. Previously worked up with negative MRI. HA offered, but patient did not want at that time due to cost. No changes in hx or exam. Pt is due for annual audio/tymp. Still has not completed.\par \par Today, patient presents with headaches for 1 month. In the past year, she has has had significant stressors with her mother and sister passing away. She has no new ENT issues. She follows with neurology and we urged her to make an appointment with them for evaluation of her headaches.  This is especially since she has a hx of aneurysm.  She understands that she needs to go to the ED if symptoms are persisting or worsening.\par \par In regards to her thyroid related questions, we referred her to endocrinology or her PCP for this. \par \par Of note, she has not been able to get her repeat audio/tymp. We have given her information regarding this. She will follow up with us to review the results of her audiogram within the month. \par \par Plan:\par - fu with neurology for headaches \par - fu with endocrinology or PCP for thyroid related issues \par - repeat audio/tymp\par - fu with us within the month to review results

## 2022-04-07 NOTE — PHYSICAL EXAM
[Midline] : trachea located in midline position [Normal] : no rashes [de-identified] : +bilateral pre auricular pit

## 2022-04-19 ENCOUNTER — APPOINTMENT (OUTPATIENT)
Dept: OTOLARYNGOLOGY | Facility: CLINIC | Age: 62
End: 2022-04-19

## 2022-05-09 ENCOUNTER — APPOINTMENT (OUTPATIENT)
Dept: PULMONOLOGY | Facility: CLINIC | Age: 62
End: 2022-05-09

## 2022-05-27 NOTE — ED ADULT NURSE NOTE - EXTENSIONS OF SELF_ADULT
Pt tolerated Orencia infusion well with no noted side effects. IV removed and pt was discharged ambulatory in baseline good condition.
None

## 2022-06-14 ENCOUNTER — APPOINTMENT (OUTPATIENT)
Dept: NEUROLOGY | Facility: CLINIC | Age: 62
End: 2022-06-14
Payer: MEDICARE

## 2022-06-14 VITALS
HEIGHT: 62 IN | TEMPERATURE: 98.2 F | DIASTOLIC BLOOD PRESSURE: 83 MMHG | SYSTOLIC BLOOD PRESSURE: 134 MMHG | HEART RATE: 86 BPM | OXYGEN SATURATION: 95 % | WEIGHT: 118 LBS | BODY MASS INDEX: 21.71 KG/M2

## 2022-06-14 PROCEDURE — 99214 OFFICE O/P EST MOD 30 MIN: CPT

## 2022-06-22 NOTE — ASSESSMENT
[FreeTextEntry1] : REEG/AmbEEG\par Continue Keppra 500 mg BID\par MRA w/o contrast for brain aneurysm follow up

## 2022-06-22 NOTE — HISTORY OF PRESENT ILLNESS
[FreeTextEntry1] : 60 y/o RHD- seizures, strokes and sarcoid\par \par Janes reports pain in the right shoulder that radiates to back of head \par \par She reports having some mild seizures - denies grand mal seizures \par She also reports speech experiencing impairment,feeling dizzy happening three to four times a month and is lasting a few seconds to minutes. \par Feels tired after each episodes\par Unsure if symptoms are related to vertigo. \par \par Saw an ENT doctor in April - reports having more hearing loss to right ear\par \par Meds: \par Keppra 500 mg BID - Denies side effects \par \par Famhx\par mother passed away at 83 yrs old from dementia, \par Older sister passed away 3 months ago from asthma\par \par --------------------------------------------------------\par New Patient\par \par 61 y/RHDW- seizures, strokes and sarcoid\par \par PMHx\par \par F/U from [facility] and [facility].\par Has been seizure free for over 6 yrs. \par Idiopathic strokes with sickle cell trait on baby ASA- likely before 2005.\par Saw pulmonologist- not seen x 6 months \par \par Had a Left ACOM 2mm aneurysm on MRA.\par EEG in Dec 2018 - LT epileptiform spikes\par \par meds- Lev 500 bid\par baby ASA one /d \par Thyroid meds 50 mcg\par \par Use to work in childcare\par

## 2022-06-22 NOTE — PHYSICAL EXAM
[General Appearance - Alert] : alert [Oriented To Time, Place, And Person] : oriented to person, place, and time [Affect] : the affect was normal [Person] : oriented to person [Place] : oriented to place [Time] : oriented to time [Span Intact] : the attention span was normal [Concentration Intact] : normal concentrating ability [Naming Objects] : no difficulty naming common objects [Repeating Phrases] : no difficulty repeating a phrase [Fluency] : fluency intact [Comprehension] : comprehension intact [Cranial Nerves Optic (II)] : visual acuity intact bilaterally,  visual fields full to confrontation, pupils equal round and reactive to light [Cranial Nerves Trigeminal (V)] : facial sensation intact symmetrically [Cranial Nerves Facial (VII)] : face symmetrical [Motor Handedness Right-Handed] : the patient is right hand dominant [Sensation Tactile Decrease] : light touch was intact [Abnormal Walk] : normal gait [1+] : Patella left 1+ [Current Events] : inadequate knowledge of current events [FreeTextEntry5] : hearing loss >R

## 2022-06-22 NOTE — DISCUSSION/SUMMARY
[FreeTextEntry1] : 60 y/o female w hx of sarcoidosis, bilateral parietal strokes, likely sickle cell trait, brain aneurysm and epilepsy.\par Stable from epilepsy point of view. \par Bilateral hearing loss NSL\par Aneurysm\par

## 2022-07-12 ENCOUNTER — APPOINTMENT (OUTPATIENT)
Dept: NEUROLOGY | Facility: CLINIC | Age: 62
End: 2022-07-12

## 2022-07-12 PROCEDURE — 95816 EEG AWAKE AND DROWSY: CPT

## 2022-07-13 ENCOUNTER — APPOINTMENT (OUTPATIENT)
Dept: NEUROLOGY | Facility: CLINIC | Age: 62
End: 2022-07-13

## 2022-07-13 PROCEDURE — 95708 EEG WO VID EA 12-26HR UNMNTR: CPT

## 2022-07-13 PROCEDURE — 95719 EEG PHYS/QHP EA INCR W/O VID: CPT

## 2022-07-13 PROCEDURE — 95700 EEG CONT REC W/VID EEG TECH: CPT

## 2022-08-23 ENCOUNTER — APPOINTMENT (OUTPATIENT)
Dept: NEUROLOGY | Facility: CLINIC | Age: 62
End: 2022-08-23

## 2022-11-15 ENCOUNTER — APPOINTMENT (OUTPATIENT)
Dept: PULMONOLOGY | Facility: CLINIC | Age: 62
End: 2022-11-15

## 2022-11-15 VITALS
HEART RATE: 76 BPM | WEIGHT: 118 LBS | HEIGHT: 62 IN | DIASTOLIC BLOOD PRESSURE: 77 MMHG | OXYGEN SATURATION: 97 % | TEMPERATURE: 98.1 F | SYSTOLIC BLOOD PRESSURE: 120 MMHG | BODY MASS INDEX: 21.71 KG/M2

## 2022-11-15 PROCEDURE — 99213 OFFICE O/P EST LOW 20 MIN: CPT

## 2022-11-16 NOTE — ASSESSMENT
[FreeTextEntry1] : Data reviewed:\par \par PA/lat CXR in office 01/06/2022 : clear lungs\par \par PFT 1/2021 [facility]: FVC 2.22L (94%), FEV1 1.58L (83%), DLCO 10.95 (62%)\par \par Impression:\par Sarcoidosis by history w mild dyspnea\par \par Plan:\par She will return for PFT.\par If that is stable, then just monitoring over time.\par If sig change from prior, then HRCT chest.

## 2022-11-16 NOTE — CONSULT LETTER
[Dear  ___] : Dear  [unfilled], [Courtesy Letter:] : I had the pleasure of seeing your patient, [unfilled], in my office today. [Please see my note below.] : Please see my note below. [Consult Closing:] : Thank you very much for allowing me to participate in the care of this patient.  If you have any questions, please do not hesitate to contact me. [Sincerely,] : Sincerely, [FreeTextEntry2] : Suzanne Brizuela MD\par [address]\[place] [address] [FreeTextEntry3] : Roberta Lau MD, FCCP\par

## 2022-11-16 NOTE — HISTORY OF PRESENT ILLNESS
[TextBox_4] : 01/06/2022: Asked to evaluate patient by Dr Brizuela (fax [phone]) for sarcoidosis, diagnosed in 1990 and treated at [facility]. She presented w dyspnea and syncope and was diagnosed by skin biopsy. She was never treated w medication. She reports that she has eye involvement but has not been treated for this either. She has seen ophtho within past year. She has a history of seizure and stroke and has seen neuro here. No recent chest imaging. She can't remember when she last saw pulm or who it was. Vaxxed for Covid x 2, not boosted. More dyspneic lately.\par \par 11/15/22: Returns for follow up. Lost mother and sister in past year. Smokes MJ. Has had an eye exam. Her breathing feels the same as David. Tested positive for Covid about 6 mos ago, recovered at home without therapy. Just got flu shot. [ESS] : 8

## 2022-12-06 ENCOUNTER — APPOINTMENT (OUTPATIENT)
Dept: PULMONOLOGY | Facility: CLINIC | Age: 62
End: 2022-12-06

## 2023-04-04 ENCOUNTER — APPOINTMENT (OUTPATIENT)
Dept: NEUROLOGY | Facility: CLINIC | Age: 63
End: 2023-04-04

## 2023-06-29 NOTE — ED ADULT NURSE NOTE - NSIMPLEMENTINTERV_GEN_ALL_ED
[Transvaginal OB Sonogram] : Transvaginal OB Sonogram [Intrauterine Pregnancy] : no intrauterine pregnancy [Yolk Sac] : no yolk sac [Fetal Heart] : no fetal heart [FreeTextEntry1] : Endo strip: 0.66\par no flow\par \par noted 3-4 cm--likely pedunculated fibroid Implemented All Universal Safety Interventions:  Ward to call system. Call bell, personal items and telephone within reach. Instruct patient to call for assistance. Room bathroom lighting operational. Non-slip footwear when patient is off stretcher. Physically safe environment: no spills, clutter or unnecessary equipment. Stretcher in lowest position, wheels locked, appropriate side rails in place.

## 2023-08-30 NOTE — BRIEF OPERATIVE NOTE - ASSISTANT(S)
Nelson Jose, Rafael Hussein Dr. Nelson Jose, Dr. Rafael Hussein Abbe Flap (Upper To Lower Lip) Text: The defect of the lower lip was assessed and measured.  Given the location and size of the defect, an Abbe flap was deemed most appropriate. Using a sterile surgical marker, an appropriate Abbe flap was measured and drawn on the upper lip. Local anesthesia was then infiltrated.  A scalpel was then used to incise the upper lip through and through the skin, vermilion, muscle and mucosa, leaving the flap pedicled on the opposite side.  The flap was then rotated and transferred to the lower lip defect.  The flap was then sutured into place with a three layer technique, closing the orbicularis oris muscle layer with subcutaneous buried sutures, followed by a mucosal layer and an epidermal layer.

## 2023-10-30 NOTE — ASSESSMENT
[FreeTextEntry1] : Data reviewed:\par \par PA/lat CXR in office 01/06/2022 : clear lungs\par \par Impression:\par Sarcoidosis by history\par \par Plan:\par Lungs look clear on CXR.\par She notes dyspnea - will bring her back for PFT.\par She does see ophtho.\par We will ask for records from [facility].
ambulate

## 2023-11-15 ENCOUNTER — APPOINTMENT (OUTPATIENT)
Dept: NEUROLOGY | Facility: CLINIC | Age: 63
End: 2023-11-15
Payer: MEDICARE

## 2023-11-15 VITALS
OXYGEN SATURATION: 98 % | HEIGHT: 62 IN | BODY MASS INDEX: 21.35 KG/M2 | TEMPERATURE: 97.8 F | DIASTOLIC BLOOD PRESSURE: 80 MMHG | HEART RATE: 85 BPM | WEIGHT: 116 LBS | SYSTOLIC BLOOD PRESSURE: 133 MMHG

## 2023-11-15 DIAGNOSIS — I72.9 ANEURYSM OF UNSPECIFIED SITE: ICD-10-CM

## 2023-11-15 PROCEDURE — 99215 OFFICE O/P EST HI 40 MIN: CPT

## 2023-11-17 PROBLEM — I72.9 ANEURYSM: Status: ACTIVE | Noted: 2018-09-18

## 2024-05-03 ENCOUNTER — APPOINTMENT (OUTPATIENT)
Dept: PULMONOLOGY | Facility: CLINIC | Age: 64
End: 2024-05-03
Payer: MEDICARE

## 2024-05-03 VITALS
WEIGHT: 118 LBS | SYSTOLIC BLOOD PRESSURE: 110 MMHG | DIASTOLIC BLOOD PRESSURE: 70 MMHG | HEIGHT: 62 IN | TEMPERATURE: 97.4 F | BODY MASS INDEX: 21.71 KG/M2 | HEART RATE: 80 BPM | OXYGEN SATURATION: 98 %

## 2024-05-03 DIAGNOSIS — D86.9 SARCOIDOSIS, UNSPECIFIED: ICD-10-CM

## 2024-05-03 PROCEDURE — 99213 OFFICE O/P EST LOW 20 MIN: CPT | Mod: 25

## 2024-05-03 PROCEDURE — 94010 BREATHING CAPACITY TEST: CPT

## 2024-05-03 RX ORDER — LEVOTHYROXINE SODIUM 137 UG/1
TABLET ORAL
Refills: 0 | Status: ACTIVE | COMMUNITY

## 2024-05-03 NOTE — HISTORY OF PRESENT ILLNESS
[TextBox_4] : 01/06/2022: Asked to evaluate patient by Dr Brizuela (fax [phone]) for sarcoidosis, diagnosed in 1990 and treated at [facility]. She presented w dyspnea and syncope and was diagnosed by skin biopsy. She was never treated w medication. She reports that she has eye involvement but has not been treated for this either. She has seen ophtho within past year. She has a history of seizure and stroke and has seen neuro here. No recent chest imaging. She can't remember when she last saw pulm or who it was. Vaxxed for Covid x 2, not boosted. More dyspneic lately.  11/15/22: Returns for follow up. Lost mother and sister in past year. Smokes MJ. Has had an eye exam. Her breathing feels the same as David. Tested positive for Covid about 6 mos ago, recovered at home without therapy. Just got flu shot.   5/3/2024: Still a little dyspneic, no real change. Smoking MJ mult times per day, no cigarettes.

## 2024-05-03 NOTE — ASSESSMENT
[FreeTextEntry1] : Data reviewed:  PA/lat CXR in office 01/06/2022 : clear lungs  PFT 1/2021 [facility]: FVC 2.22L (94%), FEV1 1.58L (83%), DLCO 10.95 (62%) [place] 5/3/2024: FVC 1.86L (77%), FEV1 1.36L (72%)  Impression: Sarcoidosis by history w mild dyspnea  Plan: Get CXR and consider CT chest - is reduction in fernando real or red herring? - bring back w full PFT.

## 2024-06-18 ENCOUNTER — APPOINTMENT (OUTPATIENT)
Dept: PULMONOLOGY | Facility: CLINIC | Age: 64
End: 2024-06-18

## 2024-06-25 ENCOUNTER — APPOINTMENT (OUTPATIENT)
Dept: PULMONOLOGY | Facility: CLINIC | Age: 64
End: 2024-06-25

## 2024-08-01 ENCOUNTER — APPOINTMENT (OUTPATIENT)
Dept: NEUROLOGY | Facility: CLINIC | Age: 64
End: 2024-08-01

## 2024-08-02 NOTE — HISTORY OF PRESENT ILLNESS
[FreeTextEntry1] : The patient is a 64 year old female with PMH of sarcoidosis of the lungs, hypothyroidism, sickle cell trait, epilepsy, bilateral sensorineural hearing loss, left supraclinoid ICA aneurysm (2 mm), and an incidentally discovered right CR small infarct which was seen on MR from 12/2020.  Since her last visit, the patient denies any new neurological symptoms. She remains on ASA 81 mg and statin therapy.  At her last visit, MRI/MRA head/neck was recommended but not completed by the patient. Lab work was requested from PCP but not received. She is not on statin therapy. BP

## 2024-09-11 ENCOUNTER — NON-APPOINTMENT (OUTPATIENT)
Age: 64
End: 2024-09-11

## 2024-09-12 ENCOUNTER — APPOINTMENT (OUTPATIENT)
Dept: PULMONOLOGY | Facility: CLINIC | Age: 64
End: 2024-09-12
Payer: COMMERCIAL

## 2024-09-12 VITALS
OXYGEN SATURATION: 98 % | BODY MASS INDEX: 22.08 KG/M2 | SYSTOLIC BLOOD PRESSURE: 120 MMHG | HEIGHT: 62 IN | HEART RATE: 84 BPM | WEIGHT: 120 LBS | DIASTOLIC BLOOD PRESSURE: 76 MMHG | TEMPERATURE: 97.3 F

## 2024-09-12 DIAGNOSIS — D86.9 SARCOIDOSIS, UNSPECIFIED: ICD-10-CM

## 2024-09-12 PROCEDURE — 99203 OFFICE O/P NEW LOW 30 MIN: CPT | Mod: 25

## 2024-09-12 PROCEDURE — 94729 DIFFUSING CAPACITY: CPT

## 2024-09-12 PROCEDURE — 94060 EVALUATION OF WHEEZING: CPT

## 2024-09-12 PROCEDURE — 99213 OFFICE O/P EST LOW 20 MIN: CPT | Mod: 25

## 2024-09-12 PROCEDURE — 94727 GAS DIL/WSHOT DETER LNG VOL: CPT

## 2024-09-12 NOTE — HISTORY OF PRESENT ILLNESS
[Never] : never [Current] : current [TextBox_4] : 2022: Asked to evaluate patient by Dr Brizuela (fax [phone]) for sarcoidosis, diagnosed in  and treated at [facility]. She presented w dyspnea and syncope and was diagnosed by skin biopsy. She was never treated w medication. She reports that she has eye involvement but has not been treated for this either. She has seen ophtho within past year. She has a history of seizure and stroke and has seen neuro here. No recent chest imaging. She can't remember when she last saw pulm or who it was. Vaxxed for Covid x 2, not boosted. More dyspneic lately.  11/15/22: Returns for follow up. Lost mother and sister in past year. Smokes MJ. Has had an eye exam. Her breathing feels the same as David. Tested positive for Covid about 6 mos ago, recovered at home without therapy. Just got flu shot.   5/3/2024: Still a little dyspneic, no real change. Smoking MJ mult times per day, no cigarettes.  2024: Again in [id number] her mother, father of her children, and favorite sister all . Sister  of fatal asthma reportedly, at age 62. Last ophtho maybe a year ago. Will send her to [facility] here. Breathing ok.

## 2024-09-12 NOTE — CONSULT LETTER
[Dear  ___] : Dear  [unfilled], [Courtesy Letter:] : I had the pleasure of seeing your patient, [unfilled], in my office today. [Please see my note below.] : Please see my note below. [Consult Closing:] : Thank you very much for allowing me to participate in the care of this patient.  If you have any questions, please do not hesitate to contact me. [Sincerely,] : Sincerely, [FreeTextEntry2] : Dorinda Brizuela MD VIA .[phone] [FreeTextEntry3] : Roberta Lau MD, [facility] Statement Selected

## 2024-09-12 NOTE — CONSULT LETTER
[Dear  ___] : Dear  [unfilled], [Courtesy Letter:] : I had the pleasure of seeing your patient, [unfilled], in my office today. [Please see my note below.] : Please see my note below. [Consult Closing:] : Thank you very much for allowing me to participate in the care of this patient.  If you have any questions, please do not hesitate to contact me. [Sincerely,] : Sincerely, [FreeTextEntry2] : Dorinda Brizuela MD VIA [phone] [FreeTextEntry3] : Roberta Lau MD, [facility]

## 2024-09-12 NOTE — ASSESSMENT
[FreeTextEntry1] : Data reviewed:  PA/lat CXR in office 01/06/2022 : clear lungs  PFT 1/2021 [facility]: FVC 2.22L (94%), FEV1 1.58L (83%), DLCO 10.95 (62%) [place] 5/3/2024: FVC 1.86L (77%), FEV1 1.36L (72%) PFT 9/12/2024: FVC 2.40L (105%), FEV1 1.80L (101%), TLC 3.25L (73%) DLCO 10.37 (51%)  Impression: Sarcoidosis by history Daily MJ  Plan: PFT is stable. CXR. Refer to Dr Goldberg for ophtho eval. Return 6 mos. Declines flu vaccine.

## 2024-09-12 NOTE — ASSESSMENT
[FreeTextEntry1] : Data reviewed:  PA/lat CXR in office 01/06/2022 : clear lungs  PFT 1/2021 [facility]: FVC 2.22L (94%), FEV1 1.58L (83%), DLCO 10.95 (62%) [place] 5/3/2024: FVC 1.86L (77%), FEV1 1.36L (72%) PFT 9/12/2024: FVC 2.40L (105%), FEV1 1.80L (101%), TLC 3.25L (73%) DLCO 10.37 (51%)  Impression: Sarcoidosis by history Daily MJ  Plan: PFT is stable. CXR. Refer to Dr Goldberg for ophtho eval. Return 6 mos. Declines flu vaccine.

## 2024-09-12 NOTE — HISTORY OF PRESENT ILLNESS
[Never] : never [Current] : current [TextBox_4] : 2022: Asked to evaluate patient by Dr Brizuela (fax [phone]) for sarcoidosis, diagnosed in  and treated at [facility]. She presented w dyspnea and syncope and was diagnosed by skin biopsy. She was never treated w medication. She reports that she has eye involvement but has not been treated for this either. She has seen ophtho within past year. She has a history of seizure and stroke and has seen neuro here. No recent chest imaging. She can't remember when she last saw pulm or who it was. Vaxxed for Covid x 2, not boosted. More dyspneic lately.  11/15/22: Returns for follow up. Lost mother and sister in past year. Smokes MJ. Has had an eye exam. Her breathing feels the same as David. Tested positive for Covid about 6 mos ago, recovered at home without therapy. Just got flu shot.   5/3/2024: Still a little dyspneic, no real change. Smoking MJ mult times per day, no cigarettes.  2024: Again in [id number] her mother, father of her children, and favorite sister all . Sister  of fatal asthma reportedly, at age 62. Last ophtho maybe a year ago. Will send her to [facility] here. Breathing ok.

## 2024-12-24 ENCOUNTER — APPOINTMENT (OUTPATIENT)
Dept: NEUROLOGY | Facility: CLINIC | Age: 64
End: 2024-12-24
Payer: MEDICARE

## 2024-12-24 VITALS
HEART RATE: 89 BPM | WEIGHT: 126.8 LBS | SYSTOLIC BLOOD PRESSURE: 133 MMHG | TEMPERATURE: 98.2 F | DIASTOLIC BLOOD PRESSURE: 80 MMHG | BODY MASS INDEX: 23.19 KG/M2 | OXYGEN SATURATION: 95 %

## 2024-12-24 DIAGNOSIS — R51.9 HEADACHE, UNSPECIFIED: ICD-10-CM

## 2024-12-24 DIAGNOSIS — I72.9 ANEURYSM OF UNSPECIFIED SITE: ICD-10-CM

## 2024-12-24 DIAGNOSIS — G40.109 LOCALIZATION-RELATED (FOCAL) (PARTIAL) SYMPTOMATIC EPILEPSY AND EPILEPTIC SYNDROMES WITH SIMPLE PARTIAL SEIZURES, NOT INTRACTABLE, W/OUT STATUS EPILEPTICUS: ICD-10-CM

## 2024-12-24 PROCEDURE — G2211 COMPLEX E/M VISIT ADD ON: CPT

## 2024-12-24 PROCEDURE — 99215 OFFICE O/P EST HI 40 MIN: CPT

## 2025-01-23 ENCOUNTER — APPOINTMENT (OUTPATIENT)
Dept: NEUROLOGY | Facility: CLINIC | Age: 65
End: 2025-01-23

## 2025-02-03 ENCOUNTER — OUTPATIENT (OUTPATIENT)
Dept: OUTPATIENT SERVICES | Facility: HOSPITAL | Age: 65
LOS: 1 days | End: 2025-02-03
Payer: COMMERCIAL

## 2025-02-03 DIAGNOSIS — S83.249A OTHER TEAR OF MEDIAL MENISCUS, CURRENT INJURY, UNSPECIFIED KNEE, INITIAL ENCOUNTER: Chronic | ICD-10-CM

## 2025-02-03 DIAGNOSIS — Z98.890 OTHER SPECIFIED POSTPROCEDURAL STATES: Chronic | ICD-10-CM

## 2025-02-03 PROCEDURE — 71046 X-RAY EXAM CHEST 2 VIEWS: CPT

## 2025-02-03 PROCEDURE — 71046 X-RAY EXAM CHEST 2 VIEWS: CPT | Mod: 26

## 2025-07-15 ENCOUNTER — APPOINTMENT (OUTPATIENT)
Dept: NEUROLOGY | Facility: CLINIC | Age: 65
End: 2025-07-15